# Patient Record
Sex: MALE | Race: BLACK OR AFRICAN AMERICAN | NOT HISPANIC OR LATINO | Employment: UNEMPLOYED | URBAN - METROPOLITAN AREA
[De-identification: names, ages, dates, MRNs, and addresses within clinical notes are randomized per-mention and may not be internally consistent; named-entity substitution may affect disease eponyms.]

---
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
## 2025-01-25 ENCOUNTER — OFFICE VISIT (OUTPATIENT)
Dept: URGENT CARE | Facility: CLINIC | Age: 20
End: 2025-01-25
Payer: COMMERCIAL

## 2025-01-25 VITALS
WEIGHT: 157 LBS | HEIGHT: 70 IN | OXYGEN SATURATION: 98 % | DIASTOLIC BLOOD PRESSURE: 86 MMHG | SYSTOLIC BLOOD PRESSURE: 117 MMHG | RESPIRATION RATE: 16 BRPM | BODY MASS INDEX: 22.48 KG/M2 | TEMPERATURE: 99 F | HEART RATE: 68 BPM

## 2025-01-25 DIAGNOSIS — R09.81 NASAL CONGESTION: Primary | ICD-10-CM

## 2025-01-25 DIAGNOSIS — J10.1 INFLUENZA A: ICD-10-CM

## 2025-01-25 LAB
CTP QC/QA: YES
POC MOLECULAR INFLUENZA A AGN: POSITIVE
POC MOLECULAR INFLUENZA B AGN: NEGATIVE

## 2025-01-25 PROCEDURE — 99213 OFFICE O/P EST LOW 20 MIN: CPT | Mod: S$GLB,,, | Performed by: PHYSICIAN ASSISTANT

## 2025-01-25 PROCEDURE — 87502 INFLUENZA DNA AMP PROBE: CPT | Mod: QW,S$GLB,, | Performed by: PHYSICIAN ASSISTANT

## 2025-01-25 RX ORDER — OSELTAMIVIR PHOSPHATE 75 MG/1
75 CAPSULE ORAL 2 TIMES DAILY
Qty: 10 CAPSULE | Refills: 0 | Status: SHIPPED | OUTPATIENT
Start: 2025-01-25 | End: 2025-01-30

## 2025-01-25 NOTE — PROGRESS NOTES
"Subjective:      Patient ID: Eladio Kendrick is a 19 y.o. male.    Vitals:  height is 5' 10" (1.778 m) and weight is 71.2 kg (157 lb). His oral temperature is 98.8 °F (37.1 °C). His blood pressure is 117/86 and his pulse is 68. His respiration is 16 and oxygen saturation is 98%.     Chief Complaint: Nasal Congestion    Pt complains of nasal congestion, headache, cough, diarrhea. Symptoms started on Thursday. Pt was exposed to the flu.    Sinus Problem  This is a new problem. The current episode started in the past 7 days. The problem is unchanged. There has been no fever. Associated symptoms include congestion, coughing and headaches.       HENT:  Positive for congestion.    Respiratory:  Positive for cough.    Skin:  Negative for erythema.   Neurological:  Positive for headaches.      Objective:     Physical Exam   Constitutional: He is oriented to person, place, and time. He appears well-developed. He is cooperative.  Non-toxic appearance. He does not appear ill. No distress.   HENT:   Head: Normocephalic and atraumatic.   Ears:   Right Ear: Hearing, tympanic membrane, external ear and ear canal normal.   Left Ear: Hearing, tympanic membrane, external ear and ear canal normal.   Nose: Nose normal. No mucosal edema, rhinorrhea, nasal deformity or congestion. No epistaxis. Right sinus exhibits no maxillary sinus tenderness and no frontal sinus tenderness. Left sinus exhibits no maxillary sinus tenderness and no frontal sinus tenderness.   Mouth/Throat: Uvula is midline, oropharynx is clear and moist and mucous membranes are normal. No trismus in the jaw. Normal dentition. No uvula swelling. No oropharyngeal exudate, posterior oropharyngeal edema or posterior oropharyngeal erythema.   Eyes: Conjunctivae, EOM and lids are normal. Pupils are equal, round, and reactive to light. Right eye exhibits no discharge. Left eye exhibits no discharge. No scleral icterus.   Neck: Trachea normal and phonation normal. Neck supple. " No JVD present. No tracheal deviation present. No thyromegaly present. No edema present. No erythema present. No neck rigidity present.   Cardiovascular: Normal rate, regular rhythm, normal heart sounds and normal pulses.   No murmur heard.Exam reveals no gallop and no friction rub.   Pulmonary/Chest: Effort normal and breath sounds normal. No stridor. No respiratory distress. He has no decreased breath sounds. He has no wheezes. He has no rhonchi. He has no rales. He exhibits no tenderness.   Abdominal: Normal appearance. He exhibits no distension. Soft. There is no abdominal tenderness. There is no rebound and no guarding.   Musculoskeletal: Normal range of motion.         General: No deformity. Normal range of motion.   Neurological: He is alert and oriented to person, place, and time. He exhibits normal muscle tone. Coordination normal.   Skin: Skin is warm, dry, intact, not diaphoretic, not pale and no rash. Capillary refill takes less than 2 seconds. No erythema   Psychiatric: His speech is normal and behavior is normal. Judgment and thought content normal.   Nursing note and vitals reviewed.    Results for orders placed or performed in visit on 01/25/25   POCT Influenza A/B Molecular    Collection Time: 01/25/25  2:50 PM   Result Value Ref Range    POC Molecular Influenza A Ag Positive (A) Negative    POC Molecular Influenza B Ag Negative Negative     Acceptable Yes     No results found.     Assessment:     1. Nasal congestion    2. Influenza A        Plan:       Nasal congestion  -     POCT Influenza A/B Molecular    Influenza A  -     oseltamivir (TAMIFLU) 75 MG capsule; Take 1 capsule (75 mg total) by mouth 2 (two) times daily. for 5 days  Dispense: 10 capsule; Refill: 0      No follow-ups on file. There are no Patient Instructions on file for this visit.

## 2025-01-25 NOTE — PATIENT INSTRUCTIONS
Patient Education       Upper Respiratory Infection ED   General Information   You came to the Emergency Department (ED) for an upper respiratory infection or URI. A URI can affect your nose, throat, ears, and sinuses. A virus is the cause of almost all URIs and antibiotics will not help you feel better more quickly. The common cold is an example of a viral URI.  URIs are easy to spread from person to person, most often through coughing or sneezing. A URI will almost always get better in a week or two without any treatment.  What care is needed at home?   Call your regular doctor to let them know you were in the ED. Make a follow-up appointment if you were told to.  If you smoke, try to quit. Your doctor or nurse can help.  Drink lots of fluids like water, juice, or broth. This will help replace any fluids lost if you have a runny nose or fever. Warm tea or soup can help soothe a sore throat.  If the air in your home feels dry, use a cool mist humidifier. This can help a stuffy nose and make it easier to breathe.  You can also use saline nose drops to relieve stuffiness.  If you decide to take over-the-counter cough or cold medicines, follow the directions on the label carefully. Be sure you do not take more than 1 medicine that contains acetaminophen. Also, if you have a heart problem or high blood pressure, check with your doctor before you take any of these medicines.  Wash your hands often. Cough or sneeze into a tissue or your elbow instead of your hands. This will help keep others healthy.  When do I need to get emergency help?   Return to the ED if:   You have trouble breathing when talking or sitting still.  When do I need to call the doctor?   You have a fever of 100.4°F (38°C) or higher for several days, chills, a very bad sore throat, or ear or sinus pain.  You develop a new fever after several days of feeling the same or improving.  You develop chest pain when you cough.  You have a cough that lasts more  than 10 days.  You cough up blood, or the color of the mucus you cough up changes.  You have new or worsening symptoms.  Last Reviewed Date   2020-09-25  Consumer Information Use and Disclaimer   This information is not specific medical advice and does not replace information you receive from your health care provider. This is only a brief summary of general information. It does NOT include all information about conditions, illnesses, injuries, tests, procedures, treatments, therapies, discharge instructions or life-style choices that may apply to you. You must talk with your health care provider for complete information about your health and treatment options. This information should not be used to decide whether or not to accept your health care providers advice, instructions or recommendations. Only your health care provider has the knowledge and training to provide advice that is right for you.  Copyright   Copyright © 2021 Up[employer]Date, Inc. and its affiliates and/or licensors. All rights reserved. Patient Education       Flu, Adult ED   General Information   You came to the Emergency Department (ED) for the flu. The flu, or influenza, is an infection that is caused by a virus. It is easy to spread from person to person. Most people get over the flu without any long-term problems. However, some people are more likely to get very sick from the flu.  You may need antiviral medicine to treat the flu. If so, it is important to take all of the medicine, even if you start to feel better. Antibiotics do not work on the flu.  What care is needed at home?   Call your regular doctor to let them know you were in the ED. Make a follow-up appointment if you were told to.  Drink lots of water, juice, or broth to replace fluids lost in runny nose and fever.  Take warm, steamy showers to help soothe the cough.  Use hard candy or cough drops to soothe sore throat and cough.  Wash your hands often. This will help keep others  healthy.  Try to thin mucus.  Drink lots of liquids.  Use a cool mist humidifier to avoid dry air.  Use saline nose drops to relieve stuffiness.  You may want to take drugs like ibuprofen, naproxen, or acetaminophen to help with fever and body aches.  When do I need to get emergency help?   Call for an ambulance right away if:   You are having so much trouble breathing that you can only say one or two words at a time.  You need to sit upright at all times to be able to breathe and or cannot lie down.  You are very tired from working to catch your breath or you are sweating from trying to breathe.  Return to the ED if:   You have trouble breathing when talking or sitting still.  You have severe chest discomfort.  You feel confused or disoriented.  When do I need to call the doctor?   You are throwing up and cant keep liquids down.  You develop early signs of fluid loss, such as:  Dark-colored urine.  Dry mouth.  Muscle cramps.  Lack of energy.  Feeling lightheaded when you get up.  You have new or worsening symptoms.  Last Reviewed Date   2020-09-24  Consumer Information Use and Disclaimer   This information is not specific medical advice and does not replace information you receive from your health care provider. This is only a brief summary of general information. It does NOT include all information about conditions, illnesses, injuries, tests, procedures, treatments, therapies, discharge instructions or life-style choices that may apply to you. You must talk with your health care provider for complete information about your health and treatment options. This information should not be used to decide whether or not to accept your health care providers advice, instructions or recommendations. Only your health care provider has the knowledge and training to provide advice that is right for you.  Copyright   Copyright © 2021 [employer]Date, [employer] and its affiliates and/or licensors. All rights reserved.

## 2025-03-05 NOTE — PATIENT INSTRUCTIONS
No hot water bathing reviewed.    Instructed patient to avoid hot water on the fingernails and toenails and to cut them short.  Can try Amlactin or Urea cream nightly and to watch for skin irritation.  If this occurs, use less often.  Discussed that toenails changes are common after the age of thirty with decreased blood flow and venous back pressure.  Fingernail changes can be spontaneous also with changes later in life.  This may be a chronic condition without much improvement with limited treatments.  Can also use petroleum jelly regularly.  No more artifical nails or press ons and no more polish.  Also reviewed that fungal infection is unlikely to be the primary issue.  Offered oral antifungal therapy with risks of liver irritation and risks of recurrence of nail changes, especially as primary architectural changes are suspected.       home